# Patient Record
Sex: MALE | Race: BLACK OR AFRICAN AMERICAN
[De-identification: names, ages, dates, MRNs, and addresses within clinical notes are randomized per-mention and may not be internally consistent; named-entity substitution may affect disease eponyms.]

---

## 2017-03-13 ENCOUNTER — HOSPITAL ENCOUNTER (EMERGENCY)
Dept: HOSPITAL 68 - ERH | Age: 57
LOS: 1 days | End: 2017-03-14
Payer: COMMERCIAL

## 2017-03-13 DIAGNOSIS — S80.11XA: Primary | ICD-10-CM
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
## 2017-03-14 VITALS — SYSTOLIC BLOOD PRESSURE: 123 MMHG | DIASTOLIC BLOOD PRESSURE: 68 MMHG
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
## 2018-01-23 ENCOUNTER — HOSPITAL ENCOUNTER (INPATIENT)
Dept: HOSPITAL 68 - ERH | Age: 58
LOS: 3 days | DRG: 440 | End: 2018-01-26
Attending: INTERNAL MEDICINE | Admitting: INTERNAL MEDICINE
Payer: COMMERCIAL

## 2018-01-23 VITALS — SYSTOLIC BLOOD PRESSURE: 104 MMHG | DIASTOLIC BLOOD PRESSURE: 70 MMHG

## 2018-01-23 VITALS — SYSTOLIC BLOOD PRESSURE: 110 MMHG | DIASTOLIC BLOOD PRESSURE: 80 MMHG

## 2018-01-23 VITALS — BODY MASS INDEX: 34.82 KG/M2 | WEIGHT: 209 LBS | HEIGHT: 65 IN

## 2018-01-23 DIAGNOSIS — E03.9: ICD-10-CM

## 2018-01-23 DIAGNOSIS — R51: ICD-10-CM

## 2018-01-23 DIAGNOSIS — I10: ICD-10-CM

## 2018-01-23 DIAGNOSIS — K80.80: ICD-10-CM

## 2018-01-23 DIAGNOSIS — E11.9: ICD-10-CM

## 2018-01-23 DIAGNOSIS — K85.90: Primary | ICD-10-CM

## 2018-01-23 DIAGNOSIS — M54.9: ICD-10-CM

## 2018-01-23 LAB
ABSOLUTE GRANULOCYTE CT: 6.6 /CUMM (ref 1.4–6.5)
BASOPHILS # BLD: 0 /CUMM (ref 0–0.2)
BASOPHILS NFR BLD: 0.3 % (ref 0–2)
EOSINOPHIL # BLD: 0.1 /CUMM (ref 0–0.7)
EOSINOPHIL NFR BLD: 0.7 % (ref 0–5)
ERYTHROCYTE [DISTWIDTH] IN BLOOD BY AUTOMATED COUNT: 13.7 % (ref 11.5–14.5)
GRANULOCYTES NFR BLD: 74.7 % (ref 42.2–75.2)
HCT VFR BLD CALC: 41.2 % (ref 42–52)
LYMPHOCYTES # BLD: 1.7 /CUMM (ref 1.2–3.4)
MCH RBC QN AUTO: 29.3 PG (ref 27–31)
MCHC RBC AUTO-ENTMCNC: 34 G/DL (ref 33–37)
MCV RBC AUTO: 86.3 FL (ref 80–94)
MONOCYTES # BLD: 0.5 /CUMM (ref 0.1–0.6)
PLATELET # BLD: 318 /CUMM (ref 130–400)
PMV BLD AUTO: 7.6 FL (ref 7.4–10.4)
RED BLOOD CELL CT: 4.77 /CUMM (ref 4.7–6.1)
WBC # BLD AUTO: 8.8 /CUMM (ref 4.8–10.8)

## 2018-01-23 PROCEDURE — G0480 DRUG TEST DEF 1-7 CLASSES: HCPCS

## 2018-01-23 PROCEDURE — 2NASP: CPT

## 2018-01-23 NOTE — ULTRASOUND REPORT
EXAMINATION:
ABDOMINAL ULTRASOUND LIMITED
 
CLINICAL INFORMATION:
Right upper quadrant pain.
 
COMPARISON:
Same day abdominal and pelvic CT.
 
TECHNIQUE:
Real-time imaging of the right upper quadrant abdominal viscera.
 
FINDINGS:
PANCREAS: The visualized pancreatic head and body are normal in appearance.
The remainder of the pancreas is obscured from visualization by the overlying
bowel gas.
 
LIVER: The liver is of normal size and diffuse increased echogenicity without
intrahepatic biliary ductal dilation. There is a 2.1 cm benign-appearing cyst
within the left lobe.
 
GALLBLADDER: There are calculi within the gallbladder lumen without wall
thickening or pericholecystic fluid.
 
COMMON BILE DUCT: Normal in caliber measuring 0.5 cm in diameter.
 
RIGHT KIDNEY: Normal. No hydronephrosis. No renal calculi or focal
parenchymal lesions.  The kidney measures 10.9 cm in maximum dimension.
 
FREE FLUID: None.
 
IMPRESSION:
Liver of diffuse increased echogenicity without focal lesions. The appearance
is nonspecific, but consistent with fatty infiltration.
 
Cholelithiasis without evidence of cholecystitis.

## 2018-01-23 NOTE — ED GI/GU/ABDOMINAL COMPLAINT
History of Present Illness
 
General
Chief Complaint: Abdominal Pain/Flank Pain
Stated Complaint: SIB WALK IN FOR US ?GALLBLADDER
Source: patient
Exam Limitations: no limitations
 
Vital Signs & Intake/Output
Vital Signs & Intake/Output
 Vital Signs
 
 
Date Time Temp Pulse Resp B/P B/P Pulse O2 O2 Flow FiO2
 
     Mean Ox Delivery Rate 
 
 0903 97.0 78 20 112/74  96 Room Air  
 
 
 
Allergies
Coded Allergies:
NO KNOWN ALLERGIES (12)
 
Reconcile Medications
Cyclobenzaprine HCl 10 MG TABLET   1 TAB PO QPM PRN SPASM
     DO NOT DRIVE WITH THIS MEDICATION
Ibuprofen 800 MG TABLET   1 TAB PO TID PRN PAIN
Levothyroxine Sodium 0.05 MG TAB   0.05 MG PO DAILY AC THYROID  (Reported)
Lisinopril/Hydrochlorothiazide (Lisinopril-Hctz 10-12.5 MG Tab) 10 MG-12.5 MG 
TABLET   1 TAB PO DAILY HTN  (Reported)
Metformin HCl 500 MG TABLET   1 TAB PO BID DM
 
Triage Note:
[facility] FOR RUQ PAIN AND DIARRHEA X 4
 DAYS. PT DENIES N/V
Triage Nurses Notes Reviewed? yes
Onset: Gradual
Duration: day(s): (4)
Timing: no prior history
Quality/Severity: burning
Severity Numbers: 7
Location: right upper quadrant
Radiation: no radiation
Activities at Onset: eating
Prior Abdominal Problems: none
No Modifying Factors: none
Modifying Factors:
Worsens With: eating. 
Associated Symptoms: diarrhea
HPI:
Patient is a 57-year-old male with history of diabetes presenting to emergency 
Department chief complaint of right upper quadrant abdominal pain with diarrhea 
for past 4 days.  Abdominal pain seems to coming go.  Patient has noticed that 
his abdominal pain is worse after eating.  Denies any nausea or vomiting.  No 
fevers or chills.  Denies chest pain palpitations or shortness of breath.  
Patient 40 start Pepto-Bismol home without relief the pain.  Went to the urgent 
care this morning for evaluation and they sent him to the ER.  No history Lm
symptoms in the past.  Denies any history of abdominal surgeries.  Denies any 
heavy lifting.  Pain is slightly worse with positional changes.  Pain is 
currently burning in nature, 7 out of 10.
(Jazmin Radford)
 
Past History
 
Travel History
Traveled to [place] past 21 day No
 
Medical History
Any Pertinent Medical History? see below for history
Neurological: NONE
EENT: allergies
Cardiovascular: hypertension
Respiratory: asthma
Gastrointestinal: NONE
Hepatic: NONE
Renal: NONE
Musculoskeletal: chronic back pain, disk herniation, R SHOULDER SURGERY 
HERNIATED DISCS PLATE IN NECK
Psychiatric: NONE
Endocrine: diabetes, hypothyroidism
Blood Disorders: NONE
Cancer(s): NONE
GYN/Reproductive: NONE
 
Surgical History
Surgical History: NECK SURGERY
 
Psychosocial History
What is your primary language English
Tobacco Use: Never used
ETOH Use: denies use
Illicit Drug Use: denies illicit drug use
 
Family History
Hx Contributory? No
(Jazmin Radford)
 
Review of Systems
 
Review of Systems
Constitutional:
Reports: no symptoms. 
Comments
Review of systems: See HPI, All other systems negative.
Constitutional, no chills fever or weight loss
HEENT: No visual changes no sore throat no congestion
Cardiovascular: No chest pain ,palpitation , orthopnea or ankle swelling
Skin, no jaundice no rashes
Respiratory: No dyspnea cough sputum or hemoptysis
GI: No nausea no vomiting
: No dysuria No hematuria
Muscle skeletal: no back pain, no neck pain,
Neurologic: No numbness no confusion no  headache
Psych: No stress anxiety or depression,.
Heme/endocrine: No bruising no bleeding no polyuria or polydipsia
Immunology: No splenectomy or history of AIDS
(Jazmin Radford)
 
Physical Exam
 
Physical Exam
General Appearance: well developed/nourished, no apparent distress, alert, awake
, comfortable
Gastrointestinal: tenderness
Comments:
Well-developed well-nourished person in no acute distress
HEENT: atraumatic/normocephalic
Back: Nontender, no CVA tenderness. 
Cardiovascular: Regular rate and rhythms no murmurs rubs or gallops
Respiratory: No respiratory distress.breath sounds clear to auscultation 
bilaterally
Abdomen: Soft, mild distension, diffuse tenderness, no appreciable organomegaly.
Normal bowel sounds. No ascites, positive carmona sign. reducable ventral hernia 
and small umbilical hernia. mild gaurding in right upper and right lower 
abdomen. 
Extremity: No edema
Neuro: Alert oriented x3
Skin: No appreciable rash on exposed skin, skin is warm and dry.
Psych: Mood and affect is normal, memory and judgment is normal.
 
Core Measures
ACS in differential dx? No
Sepsis Present: No
Sepsis Focused Exam Completed? No
(Jazmin Radford)
 
Progress
Differential Diagnosis: appendicitis, gastritis, hepatitis, hernia, urinary 
retention, UTI/pyelo
Plan of Care:
 Orders
 
 
Procedure Date/time Status
 
Nothing by Mouth  D Active
 
Patient Data  1334 Active
 
OXYGEN SETUP (GEN)  1330 Active
 
Saline Lock  1330 Active
 
Place in observation  1330 Active
 
Vital Signs  1330 Active
 
Activity/Ambulation  1330 Active
 
Code Status  1330 Active
 
Add-on Test (ER Only)  1145 Active
 
Add-on Test (ER Only)  1140 Active
 
Intake & Output  0956 Active
 
TRIGLYCERIDES  0955 Complete
 
ETHANOL  0955 Complete
 
LIPASE  09 Complete
 
LACTIC ACID 920 Complete
 
DIRECT BILIRUBIN 920 Complete
 
COMPREHENSIVE METABOLIC PANEL 920 Complete
 
CBC WITHOUT DIFFERENTIAL 920 Complete
 
 
 Current Medications
 
 
  Sig/Clem Start time  Last
 
Medication Dose  Stop Time Status Admin
 
Sodium Chloride 1,000 ML BOLUS ONE  1400 AC 
 
(Normal Saline 0.9%)    1559  
 
 
 Laboratory Tests
 
 
18 1220:
Lactic Acid Cancelled
 
18 0955:
Anion Gap 15, Estimated GFR > 60, BUN/Creatinine Ratio 18.2, Glucose 102  H, 
Lactic Acid 1.0, Calcium 9.1, Total Bilirubin 0.3, Direct Bilirubin 0.2, AST 30,
ALT 62, Alkaline Phosphatase 66, Total Protein 7.2, Albumin 4.1, Globulin 3.1, 
Albumin/Globulin Ratio 1.3, Triglycerides 93, Lipase 1959  H, CBC w Diff NO MAN 
DIFF REQ, RBC 4.77, MCV 86.3, MCH 29.3, RDW 13.7, MPV 7.6, Gran % 74.7, 
Lymphocytes % 18.9  L, Monocytes % 5.4, Eosinophils % 0.7, Basophils % 0.3, 
Absolute Granulocytes 6.6  H, Absolute Lymphocytes 1.7, Absolute Monocytes 0.5, 
Absolute Eosinophils 0.1, Absolute Basophils 0, PUBS MCHC 34.0, Serum Alcohol < 
10.0
 
 
Patient feeling improved after IV Toradol, IV hydration.  Patient will be 
admitted for pancreatitis.  Likely alcohol induced.  Patient will be observation
secondary to pancreatitis.
Diagnostic Imaging:
Viewed by Me: CT Scan.  Discussed w/RAD: CT Scan. 
Radiology Impression: PATIENT: KARMEN,EDWARD  MEDICAL RECORD NO: [id number] PRESENT 
AGE: 57  PATIENT ACCOUNT NO: [id number] : [birth year]  LOCATION: [facility] ORDERING 
PHYSICIAN: Jazmin TUBBS     SERVICE DATE: 1143 EXAM TYPE: CAT - 
CT ABD & PELVIS W/O IV CONTRAS EXAMINATION: CT ABDOMEN AND PELVIS WITHOUT 
CONTRAST CLINICAL INFORMATION: Rule out pancreatic process/stone. Pancreatitis. 
COMPARISON: There are no prior studies for comparison. TECHNIQUE: Multidetector 
volumetric imaging was performed from the superior aspect of the liver through 
the pubic symphysis. Sagittal and coronal reformatted images were obtained on 
the technologist's workstation. DLP: 900.57 mGy-cm FINDINGS: LUNG BASES: The 
visualized lung bases are unremarkable. LIVER, GALLBLADDER, AND BILIARY TREE: 
Heterogeneous attenuation in the liver suggests fatty infiltration with focal 
areas of sparing, no focal hepatic lesion is present. No biliary ductal 
dilatation is present. The gallbladder is unremarkable with no evidence of 
radiopaque gallstones, gallbladder wall thickening, or obvious pericholecystic 
inflammatory changes. PANCREAS: Pancreas is normal in appearance, the margin of 
the pancreas is very well defined, with no peripancreatic inflammatory changes 
or focal pancreatic mass lesions. There is no evidence of dilation of the 
pancreatic duct. SPLEEN: Normal. ADRENAL GLANDS: The right adrenal gland is 
normal in appearance. There is a small 1 cm nodular density in the body of the 
left adrenal gland which may represent a small adrenal mass lesion, not well 
defined measuring fluid density by Hounsfield units. KIDNEYS AND URETERS: The 
kidneys are normal in size, shape, and attenuation. No hydronephrosis, 
hydroureter, or calculi seen. No perinephric stranding. BLADDER: Nondistended. 
GASTROINTESTINAL TRACT: The small bowel loops are unremarkable. A few scattered 
diverticuli are present within the colon, with no evidence of diverticulitis 
present at this time.. The appendix is normal in appearance extending from the 
cecum towards the midline at the level of the iliac crests. ABDOMINAL WALL: No 
significant hernia is appreciated; small bilateral inguinal hernias are present.
LYMPH NODES: No pathologically enlarged lymph nodes are noted. VASCULAR: 
Unremarkable. PELVIC VISCERA: Unremarkable. OSSEOUS STRUCTURES: No focal bony 
lesions. IMPRESSION: 1. Generalized hepatic steatosis with areas of sparing. 2. 
Normal appearance of the pancreas with no pancreatic inflammatory changes or 
focal pancreatic lesions present at this time. DICTATED BY: Salud Walton MD 
DATE/TIME DICTATED:18 :RAD.GONZALEZ  DATE/TIME 
TRANSCRIBED:18 / 1252 CONFIDENTIAL, DO NOT COPY WITHOUT APPROPRIATE 
AUTHORIZATION.  <Electronically signed in Other Vendor System>                  
                                                                     SIGNED BY: 
Salud Walton MD 18 1305
Initial ED EKG: none
(Jazmin Radford)
 
Departure
 
Departure
Time of Disposition: 1330
Disposition: STILL A PATIENT
Condition: Stable
Clinical Impression
Primary Impression: Pancreatitis
Qualifiers:  Chronicity: acute Pancreatitis type: unspecified pancreatitis type 
Acute pancreatitis complication: unspecified Qualified Code: K85.90 - Acute 
pancreatitis without necrosis or infection, unspecified
Referrals:
LILIA Orlando MD (PCP/Family)
 
Departure Forms:
Customer Survey
General Discharge Information
 
Observation Note
Spoke With:
Lynda Barr MD
Physician Advisor Notified: CATRACHITO MACHADO DO
Place Patient In: Non-ED OBS Care Area
Rationale for Observation:
My rational for observation is as follows .  Patient require nothing by mouth 
status, IV hydration for pancreatitis.  Discharged at this time would be 
medically harmful.  Repeat lipase in the morning, consider GI consultation.
 
(Jazmin Radford)
 
PA/NP Co-Sign Statement
Statement:
ED Attending supervision documentation-
 
x I saw and evaluated the patient. I have also reviewed all the pertinent lab 
results and diagnostic results. I agree with the findings and the plan of care 
as documented in the PA's/NP's documentation. Abdominal pain, elevated lipase
 
[] I have reviewed the ED Record and agree with the PA's/NP's documentation.
 
[] Additions or exceptions (if any) to the PAs/NP's note and plan are 
summarized below:
[]
 
(Maria Victoria LOPEZ,Kirby)

## 2018-01-23 NOTE — CT SCAN REPORT
EXAMINATION:
CT ABDOMEN AND PELVIS WITHOUT CONTRAST
 
CLINICAL INFORMATION:
Rule out pancreatic process/stone. Pancreatitis.
 
COMPARISON:
There are no prior studies for comparison.
 
TECHNIQUE:
Multidetector volumetric imaging was performed from the superior aspect of
the liver through the pubic symphysis. Sagittal and coronal reformatted
images were obtained on the technologist's workstation.
 
DLP:
900.57 mGy-cm
 
FINDINGS:
 
LUNG BASES: The visualized lung bases are unremarkable.
 
LIVER, GALLBLADDER, AND BILIARY TREE: Heterogeneous attenuation in the liver
suggests fatty infiltration with focal areas of sparing, no focal hepatic
lesion is present. No biliary ductal dilatation is present. The gallbladder
is unremarkable with no evidence of radiopaque gallstones, gallbladder wall
thickening, or obvious pericholecystic inflammatory changes.
 
PANCREAS: Pancreas is normal in appearance, the margin of the pancreas is
very well defined, with no peripancreatic inflammatory changes or focal
pancreatic mass lesions. There is no evidence of dilation of the pancreatic
duct.
 
SPLEEN: Normal.
 
ADRENAL GLANDS: The right adrenal gland is normal in appearance. There is a
small 1 cm nodular density in the body of the left adrenal gland which may
represent a small adrenal mass lesion, not well defined measuring fluid
density by Hounsfield units.
 
KIDNEYS AND URETERS: The kidneys are normal in size, shape, and attenuation.
No hydronephrosis, hydroureter, or calculi seen. No perinephric stranding.
 
BLADDER: Nondistended.
 
GASTROINTESTINAL TRACT: The small bowel loops are unremarkable. A few
scattered diverticuli are present within the colon, with no evidence of
diverticulitis present at this time.. The appendix is normal in appearance
extending from the cecum towards the midline at the level of the iliac
crests.
 
ABDOMINAL WALL: No significant hernia is appreciated; small bilateral
inguinal hernias are present.
 
LYMPH NODES: No pathologically enlarged lymph nodes are noted.
 
VASCULAR: Unremarkable.
 
PELVIC VISCERA: Unremarkable.
 
OSSEOUS STRUCTURES: No focal bony lesions.
 
IMPRESSION:
 
1. Generalized hepatic steatosis with areas of sparing.
2. Normal appearance of the pancreas with no pancreatic inflammatory changes
or focal pancreatic lesions present at this time.

## 2018-01-23 NOTE — HISTORY & PHYSICAL
LulHendricks 01/23/18 1425:
General Information and HPI
MD Statement:
I have seen and personally examined PEGGY PERAZA and documented this H&P.
 
The patient is a 57 year old M who presented with a patient stated chief 
complaint of abdominal pain and watery diarrhea for last 2 days [].
 
Source of Information: patient, old records
Exam Limitations: no limitations
History of Present Illness:
56 YO M non smoker with PMH of DM, HTN, hypothyroidism, chronic back pain, disk 
herniation and shoulder surgery due to rotator cuff tear came to ED with 
abdominal pain and watery diarrhea for last 2 days.  Patient reported that he 
was in his usual state of health 2 days back when he suddenly noticed abdominal 
pain, sharp, 7/10, continuous, aggravated with movement and relieved with rest. 
He reported that this pain is more in the right hypochondrium and right lower 
abdomen.  Patient also reported having watery diarrhea 6-7 episode every day 
nonbloody non-foul-smelling.  Patient also reported that he is trying to quit 
alcohol drinking for last 2 years but right now he is drinking occasionally and 
his last drink was one month back.  Patient denied any chest pain, short of 
breath, palpitation, nausea, vomiting, runny nose, trauma to abdomen, headache, 
lightheadedness, loss of consciousness, blood in stool and dysuria.
Patient reported that his following his primary care physician regularly after 
every 3 months and taking his medication for hypertension and diabetes 
regularly.  Patient also reported that for last 2 weeks his primary care 
physician increased the dose of metformin to control his blood sugar level.
Patient had colonoscopy in 2011 with polypectomy.
 
ED course:
 
Vitals: Temp 97.0, pulse 78, respiratory rate 20, blood pressure 112/74, oxygen 
saturation 96% room air.
 
Labs: W BC count 8.8, hemoglobin 14.0, hematocrit 41.2, platelet count 318, 
sodium 144, potassium 4.2, BUN 20, creatinine 1.1, BUN/creatinine ratio 18.2, 
glucose 102, lactic acid 1.0, lipase 1959, triglycerides 93, albumin 4.1, total 
bilirubin 0.3, AST 30, ALT 62, alkaline phosphatase 66, total protein 7.2
 
Allergies/Medications
Allergies:
Coded Allergies:
NO KNOWN ALLERGIES (07/11/12)
 
Home Med list
Cyclobenzaprine HCl 10 MG TABLET   1 TAB PO QPM PRN SPASM
     DO NOT DRIVE WITH THIS MEDICATION
Ibuprofen 800 MG TABLET   1 TAB PO TID PRN PAIN
Levothyroxine Sodium 0.05 MG TAB   0.05 MG PO DAILY AC THYROID  (Reported)
Lisinopril/Hydrochlorothiazide (Lisinopril-Hctz 10-12.5 MG Tab) 10 MG-12.5 MG 
TABLET   1 TAB PO DAILY HTN  (Reported)
Metformin HCl 500 MG TABLET   1 TAB PO BID DM
 
 
Past History
 
Travel History
Traveled to [place] past 21 day No
 
Medical History
Neurological: NONE
EENT: allergies
Cardiovascular: hypertension
Respiratory: asthma
Gastrointestinal: NONE
Hepatic: NONE
Renal: NONE
Musculoskeletal: chronic back pain, disk herniation, R SHOULDER SURGERY 
HERNIATED DISCS PLATE IN NECK
Psychiatric: NONE
Endocrine: diabetes, hypothyroidism
Blood Disorders: NONE
Cancer(s): NONE
GYN/Reproductive: NONE
 
Surgical History
Surgical History: NECK SURGERY
 
Past Family/Social History
 
Psychosocial History
ETOH Use: denies use
Illicit Drug Use: denies illicit drug use
 
Review of Systems
 
Review of Systems
Constitutional:
Reports: no symptoms. 
EENTM:
Reports: no symptoms. 
Cardiovascular:
Reports: no symptoms. 
Respiratory:
Reports: no symptoms. 
GI:
Reports: abdominal pain, diarrhea. 
Genitourinary:
Reports: no symptoms. 
Musculoskeletal:
Reports: no symptoms. 
Neurological/Psychological:
Reports: no symptoms. 
 
Exam & Diagnostic Data
Last 24 Hrs of Vital Signs/I&O
 Vital Signs
 
 
Date Time Temp Pulse Resp B/P B/P Pulse O2 O2 Flow FiO2
 
     Mean Ox Delivery Rate 
 
01/23 0903 97.0 78 20 112/74  96 Room Air  
 
 
 Intake & Output
 
 
 01/23 1600 01/23 0800 01/23 0000
 
Intake Total   
 
Output Total   
 
Balance   
 
    
 
Patient 209 lb  
 
Weight   
 
Weight Reported by Patient  
 
Measurement   
 
Method   
 
 
 
 
Physical Exam
General Appearance Alert, Oriented X3, Cooperative, No Acute Distress
Skin No Rashes
Skin Temp/Moisture Exam: Warm/Dry
Sepsis Skin Exam (color): Normal for Ethnicity
HEENT Atraumatic, PERRLA, EOMI
Neck Supple
Cardiovascular Normal S1, Normal S2
Lungs Clear to Auscultation
Abdomen tenderness and distension, tenderness of right hypochondrium and right 
lower belly.
Neurological Normal Speech, Strength at 5/5 X4 Ext, Normal Tone, Sensation 
Intact
Extremities grade 1 pedl edema
Last 24 Hrs of Labs/Sky:
 Laboratory Tests
 
01/23/18 1220:
Lactic Acid Cancelled
 
01/23/18 0955:
Anion Gap 15, Estimated GFR > 60, BUN/Creatinine Ratio 18.2, Glucose 102  H, 
Lactic Acid 1.0, Calcium 9.1, Total Bilirubin 0.3, Direct Bilirubin 0.2, AST 30,
ALT 62, Alkaline Phosphatase 66, Total Protein 7.2, Albumin 4.1, Globulin 3.1, 
Albumin/Globulin Ratio 1.3, Triglycerides 93, Lipase 1959  H, CBC w Diff NO MAN 
DIFF REQ, RBC 4.77, MCV 86.3, MCH 29.3, RDW 13.7, MPV 7.6, Gran % 74.7, 
Lymphocytes % 18.9  L, Monocytes % 5.4, Eosinophils % 0.7, Basophils % 0.3, 
Absolute Granulocytes 6.6  H, Absolute Lymphocytes 1.7, Absolute Monocytes 0.5, 
Absolute Eosinophils 0.1, Absolute Basophils 0, PUBS MCHC 34.0, Serum Alcohol < 
10.0
 
 
Assessment/Plan
Assessment:
56 YO M non smoker with PMH of DM, HTN, hypothyroidism, chronic back pain, disk 
herniation and shoulder surgery due to rotator cuff tear came to ED with 
abdominal pain and watery diarrhea for last 2 days.
Considering patient's history of abdominal pain and elevated lipase probably its
due to pancreatitis.  We will keep the patient under observation on general 
medicine floor.
 
Acute pancreatitis:
-Nothing by mouth to provide nausea and vomiting and to give rest to pancreas.
-IV hydration with Ringer's lactate.
-IV pain medication
-IV Protonix
-IV antiemetics for nausea and vomiting.
-We will get the right upper quadrant ultrasound to rule out biliary gallstone 
that can cause acute pancreatitis.
-Patient could have autoimmune pancreatitis considering his history of 
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
-Ig G4 can be checked for autoimmune pancreatitis.
 
History of hypothyroidism:
-We will continue levothyroxin
 
History of diabetes:
-We will stop his metformin during the hospital stay.
-Accu-Cheks
-Insulin NovoLog according to sliding scale.
 
History of Hypertension:
-We will continue his home medications (lisinopril)
 
DVT prophylaxis:
Mechanical and Lovenox
 
CODE STATUS:
Full code
 
As Ranked By This Provider
Problem List:
 1. Pancreatitis
   Qualifiers
 Chronicity: acute Pancreatitis type: unspecified pancreatitis type Acute 
pancreatitis complication: unspecified Qualified Code: K85.90 - Acute 
pancreatitis without necrosis or infection, unspecified
 
 2. Diarrhea
 
 
Core Measures/Misc (9/17)
 
Acute Coronary Syndrome
ACS Diagnosis: No
 
Congestive Heart Failure
Congestive Heart Failure Diagnosis No
 
Cerebrovascular Accident
CVA/TIA Diagnosis: No
 
VTE (View Protocol)
VTE Risk Factors Age>40
No Mechanical VTE Prophylaxis d/t N/A MechProphylax Ordered
No VTE Pharm Prophylaxis d/t NA PharmProphylax ordered
 
Sepsis (View protocol)
Sepsis Present: No
 
 
Lynda Barr MD 01/23/18 1648:
Attending MD Review Statement
 
Attending Statement
Attending MD Statement: examined this patient, discuss w/resident/PA/NP, agreed 
w/resident/PA/NP, reviewed EMR data (avail)
Attending Assessment/Plan:
Patient seen and examined at bedside.  Agree with resident assessment and plan. 
Will keep NPO for now, IV hydration, IV Morphine, advance diet tomorrow as 
tolerated, 23-hour observation.
 
 
RashelloraineConner 01/23/18 1711:
Resident Review Statement
Resident Statement: examined this patient, discussed with intern, agreed with 
intern, discussed with family, reviewed EMR data (avail), discussed with nursing
, discussed with case mgmt, reviewed images, amended to note
Other Findings:
This is 57 -year-old man with a medical history of of type 2 diabetes mellitus 
diagnosed past month, hypertension, hypothyroidism, chronic back pain, disk 
herniation and shoulder surgery due to rotator cuff tear.  Presented to the 
emergency department with a chief complaint of with abdominal pain that started 
on Friday and associated with watery diarrhea for last 2 days.  He stated that 
the abdominal pain started suddenly, aggravated with movement and relieved with 
rest.  His watery diarrhea 6-7 episode every day nonbloody non-foul-smelling.  
Patient deny any alcohol overuse, he states he drink occasional.  He deny 
current smoking.  He deny any previous history of gallstones.  Patient deny any 
family history of cancers. 
 
Physical examination, lab and imaging as above.
 
Assessment:
 
-Abdominal pain: His abdominal pain secondary to underlying acute pancreatitis, 
will need to obtain abdominal ultrasound to rule out any gallstone.  Also the 
patient has positive antithyroid peroxidase antibody that go along with 
autoimmune thyroiditis.  So autoimmune can be the cause of his pancreatitis.  
His triglycerides within normal. BISAP score =0
 
-Diarrhea: His diuretic can be due to his acute pancreatitis versus inflammatory
, and infection underlying causes.  Will observe and send stool culture and ova,
parasite for further evaluation patient denies any recent use of antibiotic.  
And patient not on chronic PPI it is least C. difficile.  But as the patient was
in the hospital for the past 4 days taking care of his family member will sent 
C. difficile.
 
Plan:
-Admit the patient to general medicine floor
-Vitals every shift
-Continue IV fluid aggressive hydration at 200 mL per hour lactated Ringer
-Keep the patient nothing by mouth advance the diet as torelate
-Obtain abdominal ultrasound
-Adequate pain medication with IV morphine
-IV Zofran as needed for nausea/vomiting
-IV Protonix 40 mg daily
-Accu-Chek, nothing by mouth insulin sliding scale
-Hold hydrochlorothiazide, continue lisinopril
-Send C. difficile, stool culture, ova and parasite
-Pain pathway
-DVT prophylaxis subcutaneous Lovenox
-Full code

## 2018-01-24 VITALS — DIASTOLIC BLOOD PRESSURE: 80 MMHG | SYSTOLIC BLOOD PRESSURE: 124 MMHG

## 2018-01-24 VITALS — DIASTOLIC BLOOD PRESSURE: 86 MMHG | SYSTOLIC BLOOD PRESSURE: 116 MMHG

## 2018-01-24 VITALS — SYSTOLIC BLOOD PRESSURE: 120 MMHG | DIASTOLIC BLOOD PRESSURE: 88 MMHG

## 2018-01-24 LAB
ABSOLUTE GRANULOCYTE CT: 4.1 /CUMM (ref 1.4–6.5)
BASOPHILS # BLD: 0 /CUMM (ref 0–0.2)
BASOPHILS NFR BLD: 0.4 % (ref 0–2)
EOSINOPHIL # BLD: 0.1 /CUMM (ref 0–0.7)
EOSINOPHIL NFR BLD: 0.9 % (ref 0–5)
ERYTHROCYTE [DISTWIDTH] IN BLOOD BY AUTOMATED COUNT: 14.2 % (ref 11.5–14.5)
GRANULOCYTES NFR BLD: 63.7 % (ref 42.2–75.2)
HCT VFR BLD CALC: 37.3 % (ref 42–52)
LYMPHOCYTES # BLD: 1.9 /CUMM (ref 1.2–3.4)
MCH RBC QN AUTO: 29.3 PG (ref 27–31)
MCHC RBC AUTO-ENTMCNC: 33.7 G/DL (ref 33–37)
MCV RBC AUTO: 86.8 FL (ref 80–94)
MONOCYTES # BLD: 0.4 /CUMM (ref 0.1–0.6)
PLATELET # BLD: 289 /CUMM (ref 130–400)
PMV BLD AUTO: 7.5 FL (ref 7.4–10.4)
RED BLOOD CELL CT: 4.3 /CUMM (ref 4.7–6.1)
WBC # BLD AUTO: 6.5 /CUMM (ref 4.8–10.8)

## 2018-01-24 NOTE — PN- HOUSESTAFF
LulDamascus 18 0727:
Subjective
Follow-up For:
Acute pancreatitis
Subjective:
No overnight events.  Patient remained afebrile overnight.  Seen and examined 
this morning.  Patient was complaining of abdominal pain 8/10.  He denied any 
chest pain, palpitation, nausea, vomiting, chills, fever, lightheadedness and 
dysuria.  We will start liquids and if it's start tolerating it then we will 
advance the diet.
 
Review of Systems
Constitutional:
Reports: no symptoms. 
EENTM:
Reports: no symptoms. 
Cardiovascular:
Reports: no symptoms. 
Respiratory:
Reports: no symptoms. 
Gastrointestinal:
Reports: abdominal pain. 
Genitourinary:
Reports: no symptoms. 
Musculoskeletal:
Reports: no symptoms. 
Neurological/Psychological:
Reports: no symptoms. 
 
Objective
Last 24 Hrs of Vital Signs/I&O
 Vital Signs
 
 
Date Time Temp Pulse Resp B/P B/P Pulse O2 O2 Flow FiO2
 
     Mean Ox Delivery Rate 
 
 1012  77  116/86     
 
 0547 97.6 77 20 116/86  96 Room Air  
 
 2158 98.1 76 20 104/70  98 Room Air  
 
 1829 97.7 68 20 110/80  98 Room Air  
 
 1737 97.0 73 16 129/84  98 Room Air  
 
 1522 98.0 86 18 112/70  95 Room Air  
 
 1508    112/70     
 
 
 Intake & Output
 
 
  1600  0800  0000
 
Intake Total  1650 2500
 
Output Total  675 
 
Balance  975 2500
 
    
 
Intake, IV  1600 2500
 
Intake, Oral  50 0
 
Number  0 
 
Bowel   
 
Movements   
 
Output, Urine  675 
 
Patient   200 lb
 
Weight   
 
 
 
 
Physical Exam
General Appearance: Alert, Oriented X3, Cooperative, No Acute Distress
Skin Temp/Moisture Exam: Warm/Dry
HEENT: Atraumatic, PERRLA, EOMI
Neck: Supple
Cardiovascular: Normal S1, Normal S2
Lungs: Clear to Auscultation
Abdomen: right hypochondrial tenderness
Neurological: Normal Speech, Strength at 5/5 X4 Ext, Normal Tone, Sensation 
Intact
Extremities: No Edema
 
Assessment/Plan
Assessment:
56 YO M non smoker with PMH of DM, HTN, hypothyroidism, chronic back pain, disk 
herniation and shoulder surgery due to rotator cuff tear came to ED with 
abdominal pain and watery diarrhea for last 2 days.
Considering patient's history of abdominal pain and elevated lipase probably its
due to pancreatitis.  We will keep the patient under observation on general 
medicine floor.
 
Acute pancreatitis:
-Nothing by mouth to provide nausea and vomiting and to give rest to pancreas.
-IV hydration with Ringer's lactate.
-IV pain medication
-IV Protonix
-IV antiemetics for nausea and vomiting.
-We will get the right upper quadrant ultrasound to rule out biliary gallstone 
that can cause acute pancreatitis.
-Patient could have autoimmune pancreatitis considering his history of 
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
-Ig G4 can be checked for autoimmune pancreatitis.
 
History of hypothyroidism:
-We will continue levothyroxin
 
History of diabetes:
-We will stop his metformin during the hospital stay.
-Accu-Cheks
-Insulin NovoLog according to sliding scale.
 
History of Hypertension:
-We will continue his home medications (lisinopril)
 
DVT prophylaxis:
Mechanical and Lovenox
 
CODE STATUS:
Full code
Problem List:
 1. Pancreatitis
 
Pain Ratin
Pain Location:
abdomen
Pain Goal: Pain 4 or less
Pain Plan:
dilaudid for severe pain
Tomorrow's Labs & Rationales:
cbc/bep
 
 
Jayson Jacobo MD 18 1706:
Attending MD Review Statement
 
Attending Statement
Attending MD Statement: examined this patient, discuss w/resident/PA/NP, agreed 
w/resident/PA/NP, reviewed EMR data (avail), discussed with case mgmt, reviewed 
images, amended to note
Attending Assessment/Plan:
The patient was seen and discussed with house staff. Still with significant 
pain. US showing gallstones, however no bump in LFT's. Possible etiologies of 
pancreatitis include gallstones vs medication (HCTZ). Will hold HCTZ and follow.
Continue IV fluids and IV narcotics. Will convert patient to full admission as 
still requiring IV meds/fluids. If improves will suggest OP follow-up with 
surgery to consider lap azeb in future. If more symptoms will re-check LFT's, 
etc.

## 2018-01-25 VITALS — DIASTOLIC BLOOD PRESSURE: 80 MMHG | SYSTOLIC BLOOD PRESSURE: 132 MMHG

## 2018-01-25 VITALS — DIASTOLIC BLOOD PRESSURE: 82 MMHG | SYSTOLIC BLOOD PRESSURE: 120 MMHG

## 2018-01-25 VITALS — DIASTOLIC BLOOD PRESSURE: 90 MMHG | SYSTOLIC BLOOD PRESSURE: 140 MMHG

## 2018-01-25 VITALS — DIASTOLIC BLOOD PRESSURE: 94 MMHG | SYSTOLIC BLOOD PRESSURE: 136 MMHG

## 2018-01-25 NOTE — DISCHARGE SUMMARY
Visit Information
 
Visit Dates
Admission Date:
01/24/18
 
Discharge Date:
01/26/18
 
 
Hospital Course
 
Course
Attending Physician:
Lynda Barr MD
 
Primary Care Physician:
LILIA Orlando MD [facility]
 
Hospital Course:
58 YO M non smoker with PMH of DM, HTN, hypothyroidism, chronic back pain, disk 
herniation and shoulder surgery due to rotator cuff tear came to ED with 
abdominal pain and watery diarrhea for last 2 days.
 
ED course:
 
Vitals: Temp 97.0, pulse 78, respiratory rate 20, blood pressure 112/74, oxygen 
saturation 96% room air.
 
Labs: W BC count 8.8, hemoglobin 14.0, hematocrit 41.2, platelet count 318, 
sodium 144, potassium 4.2, BUN 20, creatinine 1.1, BUN/creatinine ratio 18.2, 
glucose 102, lactic acid 1.0, lipase 1959, triglycerides 93, albumin 4.1, total 
bilirubin 0.3, AST 30, ALT 62, alkaline phosphatase 66, total protein 7.2
 
 
Considering patient's history of abdominal pain and elevated lipase probably its
due to pancreatitis.  Observation was converted to full admission considering 
patient's severe pain yesterday.
 
Acute pancreatitis:
 
Patient was admitted and kept nothing by mouth.  Patient was given IV fluids and
IV Protonix.  Patient also received IV an antiemetic as needed.  Later on 
patient was started on clear liquid diet as tolerated we advanced the diet.  
Patient was complaining of abdominal pain after quadrant ultrasound was done 
that showed asymptomatic cholelithiasis. Patient's LFTs were monitored that 
remained normal. The probable cause could be autoimmune or medication for his 
pancreatitis.  Patient was advised procedure surgery as outpatient to get 
cholecystectomy in future to prevent episodes of pancreatitis due to gallstone 
in future.
 
History of hypothyroidism:
We continued levothyroxin.
 
History of diabetes:
Accu-Cheks were done and patient was kept on insulin NovoLog during hospital 
stay.
 
History of Hypertension:
Patient was on combination of lisinopril and hydrochlorothiazide considering 
patient's pancreatitis discontinued the combination medication and patient had 
pancreatitis.  As hydrochlorothiazide can cause pancreatitis.  Patient was given
lisinopril.
 
DVT prophylaxis:
Mechanical and Lovenox
 
CODE STATUS:
Full code
Allergies:
Coded Allergies:
NO KNOWN ALLERGIES (07/11/12)
 
Pertinent Lab Results:
Abdominal ultrasound on 01/23/2018:
IMPRESSION:
Liver of diffuse increased echogenicity without focal lesions. The appearance
is nonspecific, but consistent with fatty infiltration.
 
Cholelithiasis without evidence of cholecystitis.
 
Abdominal/pelvic CT scan on 01/23/2018:
MPRESSION:
 
1. Generalized hepatic steatosis with areas of sparing.
2. Normal appearance of the pancreas with no pancreatic inflammatory changes
or focal pancreatic lesions present at this time.
 
WBC count 6.5, hemoglobin 12.6, hematocrit 37.3, platelet count 289, sodium 144,
potassium 3.9, creatinine 1.1, BUN 12,
 
Disposition Summary
 
Disposition
Principal Diagnosis:
Acute pancreatitis
Additional Diagnosis:
DM
HTN
Hypothyroidism
Discharge Disposition: home or self care
 
Discharge Instructions
 
General Discharge Information
Code Status: Full Code
Patient's Diet:
Diabetic diet
Patient's Activity:
Self limited
Follow-Up Instructions/Appts:
Follow up with your pcp in one week.
Follow up with general surgery for cholecystectomy in future.
 
Medications at Discharge
Discharge Medications:
Stop taking the following medications:
Lisinopril/Hydrochlorothiazide (Lisinopril-Hctz 10-12.5 MG Tab) 10 MG-12.5 MG 
TABLET ORAL DAILY Qty = 30
 
Continue taking these medications:
Levothyroxine Sodium (Levothyroxine Sodium) 0.05 MG TAB
    0.05 Milligram ORAL DAILY BEFORE BREAKFAST
 
Ibuprofen (Ibuprofen) 800 MG TABLET
    1 Tablet ORAL THREE TIMES DAILY as needed for PAIN
    Qty = 20
    Comments:
       NOT TAKEN IN HOSPITAL
 
Cyclobenzaprine HCl (Cyclobenzaprine HCl) 10 MG TABLET
    1 Tablet ORAL Every night as needed for SPASM
    Qty = 30
    Instructions:
       DO NOT DRIVE WITH THIS MEDICATION
    Comments:
       NOT TAKEN IN HOSPITAL
 
Metformin HCl (Metformin HCl) 500 MG TABLET
    1 Tablet ORAL TWICE DAILY
    Qty = 28
    Comments:
       NOT TAKEN IN HOSPITAL
 
Start taking the following new medications:
Lisinopril (Lisinopril) 10 MG TABLET
    1 Tablet ORAL DAILY
    Qty = 30
    No Refills
    Comments:
       Last Taken: 1/26/18
             Time: 0600 AM
 
Omeprazole (Omeprazole) 20 MG CAPSULE.DR
    1 Capsule ORAL DAILY
    Qty = 20
    No Refills
    Comments:
       Last Taken: 1/26/18
             Time: 0600 AM
 
 
Copies To:
LILIA Orlando MD

## 2018-01-25 NOTE — PATIENT DISCHARGE INSTRUCTIONS
Discharge Instructions
 
General Discharge Information
You were seen/treated for:
Acute pancreatitis
Watch for these problems:
Increase in abdominal pain, nausea, vomiting, abdominal distension, fever, 
chills and diarrhea.
If you experience any of these symptoms come to ED or call to pcp.
Special Instructions:
Follow with your primary care physician in one week.
Follow-up with general surgery and discussed about cholecystectomy in future 
considering having gallstones.
The combination of lisinopril/hydrochlorothiazide was discontinued and patient 
was started on lisinopril 10 mg.  Considering hydrochlorothiazide can cause 
pancreatitis.
Please discuss with your primary care physician for adjustment of the dose for 
anti-hypertensive medications according to blood pressure.
 
Diet
Recommended Diet: Diabetic
 
Activity
Activity Self Limited: Yes
 
Acute Coronary Syndrome
 
Inclusion Criteria
At DC or during hospital stay patient has or had the following:
ACS DIAGNOSIS No
 
Discharge Core Measures
Meds if any: Prescribed or Continued at Discharge
Meds if any: NOT Prescribed or Continued at Discharge
 
Congestive Heart Failure
 
Inclusion Criteria
At DC or during hospital stay patient has or had the following:
CHF DIAGNOSIS No
 
Discharge Core Measures
Meds if any: Prescribed or Continued at Discharge
Meds if any: NOT Prescribed or Continued at Discharge
 
Cerebrovascular accident
 
Inclusion Criteria
At DC or during hospital stay patient has or had the following:
CVA/TIA Diagnosis No
 
Discharge Core Measures
Meds if any: Prescribed or Continued at Discharge
Meds if any: NOT Prescribed or Continued at Discharge
 
Venous thromboembolism
 
Inclusion Criteria
VTE Diagnosis No
VTE Type NONE
VTE Confirmed by (Test) NONE
 
Discharge Core Measures
- Per Current guidelines, there needs to be overlap
- treatment for the first 5 days of Warfarin therapy.
- If discharged on Warfarin prior to 5 days of
- overlap therapy, the patient will need to be
- assessed for post discharge needs including
- *Post discharge parental anticoagulation
- *Warfarin and/or parental anticoagulation education
- *Follow up date to check INR post discharge
At least 5 days overlap therapy as Inpatient No
Meds if any: Prescribed or Continued at Discharge
Note: Overlap Therapy is Warfarin and Anticoagulant
Meds if any: NOT Prescribed or Continued at Discharge

## 2018-01-25 NOTE — PN- HOUSESTAFF
[place] 18 0734:
Subjective
Follow-up For:
Acute pancreatitis
Subjective:
No overnight events.  Patient remained afebrile overnight.  Seen and examined 
this morning.  Patient denied any chest pain, short of breath, nausea, vomiting,
lightheadedness and dysuria. We'll have advanced his diet to full liquid and if 
he tolerates was advanced to regular diet during dinner time.
 
Review of Systems
Constitutional:
Reports: no symptoms. 
EENTM:
Reports: no symptoms. 
Cardiovascular:
Reports: no symptoms. 
Respiratory:
Reports: no symptoms. 
Gastrointestinal:
Reports: abdominal pain. 
Genitourinary:
Reports: no symptoms. 
Neurological/Psychological:
Reports: no symptoms. 
 
Objective
Last 24 Hrs of Vital Signs/I&O
 Vital Signs
 
 
Date Time Temp Pulse Resp B/P B/P Pulse O2 O2 Flow FiO2
 
     Mean Ox Delivery Rate 
 
 0642 98.1 90 20 120/82  97 Room Air  
 
 2212 98.1 67 20 120/88  97   
 
 1412 97.5 84 20 124/80  96 Room Air  
 
 1012  77  116/86     
 
 
 Intake & Output
 
 
  1600  0800  0000
 
Intake Total  120 100
 
Output Total  100 
 
Balance  20 100
 
    
 
Intake, Oral  120 100
 
Output, Urine  100 
 
Patient   209 lb
 
Weight   
 
 
 
 
Physical Exam
General Appearance: Alert, Oriented X3, Cooperative, No Acute Distress
Skin Temp/Moisture Exam: Warm/Dry
Sepsis Skin Exam (color): Normal for Ethnicity
HEENT: Atraumatic, PERRLA, EOMI
Neck: Supple
Cardiovascular: Normal S1, Normal S2
Lungs: Clear to Auscultation
Abdomen: Soft
Neurological: Normal Speech, Strength at 5/5 X4 Ext, Normal Tone, Sensation 
Intact
Extremities: No Edema
 
Assessment/Plan
Assessment:
58 YO M non smoker with PMH of DM, HTN, hypothyroidism, chronic back pain, disk 
herniation and shoulder surgery due to rotator cuff tear came to ED with 
abdominal pain and watery diarrhea for last 2 days.
Considering patient's history of abdominal pain and elevated lipase probably its
due to pancreatitis.  Observation was converted to full admission considering 
patient's severe pain yesterday.
 
Acute pancreatitis:
-IV pain medication
-PO Protonix
-IV antiemetics for nausea and vomiting.
-Patient could have autoimmune pancreatitis considering his history of 
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
-Ig G4 can be checked for autoimmune pancreatitis.
-Ultrasound abdomen showed gallstones but there is no acute cholecystitis and 
his LFTs are normal.
-Patient is tolerating full liquid diet.
 
History of hypothyroidism:
-We will continue levothyroxin
 
History of diabetes:
-We will stop his metformin during the hospital stay.
-Accu-Cheks
-Insulin NovoLog according to sliding scale.
 
History of Hypertension:
-We will continue his home medications (lisinopril)
 
DVT prophylaxis:
Mechanical and Lovenox
 
CODE STATUS:
Full code
Problem List:
 1. Pancreatitis
 
Pain Ratin
Pain Location:
abdomen
Pain Goal: Remain pain free
Pain Plan:
tylenol for mild pain
Tomorrow's Labs & Rationales:
cbc/bep
 
 
Lynda Barr MD 18 1245:
Attending MD Review Statement
 
Attending Statement
Attending MD Statement: examined this patient, discuss w/resident/PA/NP, agreed 
w/resident/PA/NP, reviewed EMR data (avail)
Attending Assessment/Plan:
Patient improving.  Continue current management, advance diet as tolerated.  
Anticipated discharge tomorrow, discontinuing HCTZ, please send CMR to pharmacy 
for review.

## 2018-01-26 VITALS — SYSTOLIC BLOOD PRESSURE: 130 MMHG | DIASTOLIC BLOOD PRESSURE: 86 MMHG

## 2018-01-26 VITALS — SYSTOLIC BLOOD PRESSURE: 130 MMHG | DIASTOLIC BLOOD PRESSURE: 80 MMHG

## 2018-01-26 NOTE — PN- HOUSESTAFF
**See Addendum**
Subjective
Follow-up For:
Acute Pancreatitis
Watery diarrhea
Subjective:
Patient reports HA overnight relieved with Acetaminophen. He had no bowel 
movement but he tolerated food well yesterday
 
Review of Systems
Constitutional:
Reports: see HPI. 
 
Objective
Last 24 Hrs of Vital Signs/I&O
 Vital Signs
 
 
Date Time Temp Pulse Resp B/P B/P Pulse O2 O2 Flow FiO2
 
     Mean Ox Delivery Rate 
 
 0819  70  130/80     
 
 0632 97.6 70 20 130/86  100 Room Air  
 
 2241 98.1 74 20 136/94  99 Room Air  
 
 1555 98.0 70 20 132/80  96 Room Air  
 
 1515 97.9 74 18 140/90  92 Room Air  
 
 
 Intake & Output
 
 
  1600  0800  0000
 
Intake Total  100 
 
Output Total   
 
Balance  100 
 
    
 
Intake, Oral  100 
 
 
 
 
Physical Exam
General Appearance: Alert, Oriented X3, Cooperative
HEENT: Atraumatic, PERRLA, EOMI, Mucous Membr. moist/pink
Neck: Supple, No JVD, No thryomegaly
Cardiovascular: Regular Rate, Normal S1, Normal S2
Lungs: Clear to Auscultation, Normal Air Movement
Abdomen: Normal Bowel Sounds, Soft, No Tenderness
Extremities: No Edema
Current Medications:
 Current Medications
 
 
  Sig/Clem Start time  Last
 
Medication Dose Route Stop Time Status Admin
 
Acetaminophen 650 MG ONCE ONE  0700 DC 
 
  PO  0701  0656
 
Enoxaparin Sodium 40 MG DAILY  1439  
 
  SC   0952
 
Hydromorphone HCl 1 MG Q4P PRN  1000 AC 
 
  IV   
 
Insulin Aspart 0 TIDAC  1200 AC 
 
  SC   
 
Insulin Human Regular 0 Q6  1447 [place] 
 
  SC   0602
 
Levothyroxine Sodium 0.05 MG DAILY AC  0700 AC 
 
  PO   0622
 
Lisinopril 10 MG DAILY  1442 AC 
 
  PO   0819
 
Omeprazole 40 MG DAILY AC  1003 AC 
 
  PO   0622
 
Ondansetron HCl 4 MG Q8P PRN  1445 AC 
 
  IV   
 
Pantoprazole Sodium 40 MG DAILY  1443 DC 
 
  IV   0952
 
Potassium Chloride 40 MEQ ONCE ONE  1500 DC 
 
  PO  1501  1551
 
 
 
 
Last 24 Hrs of Lab/Sky Results
Last 24 Hrs of Labs/Mics:
 Laboratory Tests
 
18 0805:
Sodium Pending, Potassium Pending, Chloride Pending, Carbon Dioxide Pending, 
Anion Gap Pending, BUN Pending, Creatinine Pending, BUN/Creatinine Ratio Pending
 
 
Assessment/Plan
Assessment:
56 YO M non smoker with PMH of DM, HTN, hypothyroidism, chronic back pain, disk 
herniation and shoulder surgery due to rotator cuff tear came to ED with 
abdominal pain and watery diarrhea for last 2 days.
Considering patient's history of abdominal pain and elevated lipase probably its
due to pancreatitis.  Observation was converted to full admission considering 
patient's severe pain yesterday.
 
Acute pancreatitis:
-PO pain medication
-PO Protonix
-IV antiemetics for nausea and vomiting.
-Patient could have autoimmune pancreatitis considering his history of 
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
-Ig G4 can be checked for autoimmune pancreatitis.
-Ultrasound abdomen showed gallstones but there is no acute cholecystitis and 
his LFTs are normal.
-Patient is tolerating full liquid diet.
 
History of hypothyroidism:
-We will continue levothyroxin
 
History of diabetes:
-We will stop his metformin during the hospital stay.
-Accu-Cheks
-Insulin NovoLog according to sliding scale.
 
History of Hypertension:
-We will continue his home medications (lisinopril)
 
DVT prophylaxis:
Mechanical and Lovenox
 
CODE STATUS:
Full code
Problem List:
 1. Diarrhea
 
 2. Pancreatitis
 
Pain Ratin
Pain Location:
NA
Pain Goal: Remain pain free
Pain Plan:
Acetaminophen, Hydromorphone
Tomorrow's Labs & Rationales:
CBC, BEP
 
Discharge Plan
Discharge Disposition: Patient stable for DC home today

## 2018-02-09 ENCOUNTER — HOSPITAL ENCOUNTER (OUTPATIENT)
Dept: HOSPITAL 68 - STS | Age: 58
End: 2018-02-09
Attending: SURGERY
Payer: COMMERCIAL

## 2018-02-09 VITALS — BODY MASS INDEX: 34.99 KG/M2 | HEIGHT: 65 IN | WEIGHT: 210 LBS

## 2018-02-09 DIAGNOSIS — E03.9: ICD-10-CM

## 2018-02-09 DIAGNOSIS — I10: ICD-10-CM

## 2018-02-09 DIAGNOSIS — Z79.84: ICD-10-CM

## 2018-02-09 DIAGNOSIS — E11.9: ICD-10-CM

## 2018-02-09 DIAGNOSIS — K80.10: Primary | ICD-10-CM

## 2018-02-09 DIAGNOSIS — Z87.19: ICD-10-CM

## 2018-02-09 PROCEDURE — C9399 UNCLASSIFIED DRUGS OR BIOLOG: HCPCS

## 2018-02-09 NOTE — OPERATIVE REPORT
Operative/Inv Procedure Report
Surgery Date: 02/09/18
Name of Procedure:
Laparoscopic cholecystectomy
Pre-Operative Diagnosis:
Biliary pancreatitis
Post-Operative Diagnosis:
Same
Estimated Blood Loss: less than 50ml
Surgeon/Assistant:
rBent LOPEZ,Azael DUTTON/Laura TUBBS
 
Anesthesia: general endotracheal tube
Drains:
None
Specimens:
Gallbladder
Operative Indication:
57-year-old male with history of pancreatitis that has since resolved.  He is 
found to have gallstones and presents for elective resection
 
Operative/Procedure Note
Note:
After informed consent patient is brought to the operating room and laid supine.
 General anesthesia was obtained and his abdomen was prepped and draped.  The 
skin above the umbilicus infiltrated with local anesthesia and a curvilinear 
incision made sharply.  We came down through the subcutaneous tissues bluntly 
and grasped the fascia with [place]'s.  A fasciotomy was created sharply and stay 
sutures placed.  The peritoneum was entered sharply and a blunt Valentino port was 
placed.  Pneumoperitoneum was achieved.  3, 5 mm ports were placed in the 
epigastrium and right upper quadrant after local anesthesia was instilled and 
under direct vision the camera.  he's placed in reverse Trendelenburg and 
rotated towards the left.  The gallbladder is identified.  It was grasped at the
dome and retracted towards the head.  Infundibulum was then grasped.  Adhesions 
to the undersurface were taken down with blunt and cautery dissection.  
Gallbladder was encased in fatty tissue making identification of structures a 
little bit more challenging.  We dissected both sides the triangle Calot 
peritoneal tissue with cautery.  The artery was medial and its normal anatomic 
position.  It was cauterized medially to allow it to be mobilized away from the 
duct.  Tyler was cleared of areolar tissue with cautery.  The arteries and 
duct were doubly ligated with clips.   Gallbladder is removed from the fossa 
electrocautery.  There was chronic inflammatory changes in the plane between the
gallbladder and the liver.  Dissection was difficult but carried forth with no 
complications.  The gallbladder was placed in Endo Catch bag and cinched up.  
Right upper quadrant was and suction irrigated normal saline.  Hemostasis 
achieved with cautery.  The ports were then removed and the gallbladder 
delivered and passed off the field.  The fascia was closed with 0 Vicryl suture.
 Skin incisions closed with 4-0 Vicryl.  Steri-Strips and sterile dressing 
applied.  Sponge and needle counts are correct.
CC:
Darion LOPEZ,Carlo Mack